# Patient Record
(demographics unavailable — no encounter records)

---

## 2024-12-02 NOTE — ADDENDUM
[FreeTextEntry1] : PFT reviewed is within normal limits.  The diffusion capacity is mildly increased.  I feel this patient is in optimal status to proceed with bariatric surgery.  There is no pulmonary contraindication to bariatric surgery.

## 2024-12-02 NOTE — HISTORY OF PRESENT ILLNESS
[TextBox_4] : 20-year-old female non-smoker for evaluation of bariatric surgery.  She gives a history of mild asthma as a child but outgrew this at age 7.  Since then she has no symptoms whatsoever of cough wheezing or shortness of breath.  She denies loud snoring or witnessed apneas.  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  She denies excess daytime sleepiness.  Past medical history only significant for anxiety on Zoloft.  She is 5 feet 5 weight 280.  She now is being evaluated for pulmonary preop clearance for bariatric surgery.

## 2024-12-02 NOTE — ASSESSMENT
[FreeTextEntry1] : Patient with morbid obesity being evaluated for bariatric surgery.  There is no evidence of underlying lung disease.  She gives no history suggestive of significant sleep apnea.  Pulmonary clearance will follow-up pending result of PFT.